# Patient Record
Sex: FEMALE | Race: WHITE | NOT HISPANIC OR LATINO | URBAN - METROPOLITAN AREA
[De-identification: names, ages, dates, MRNs, and addresses within clinical notes are randomized per-mention and may not be internally consistent; named-entity substitution may affect disease eponyms.]

---

## 2017-03-23 ENCOUNTER — OFFICE VISIT - RIVER FALLS (OUTPATIENT)
Dept: FAMILY MEDICINE | Facility: CLINIC | Age: 57
End: 2017-03-23
Payer: COMMERCIAL

## 2020-11-11 ENCOUNTER — OFFICE VISIT - RIVER FALLS (OUTPATIENT)
Dept: FAMILY MEDICINE | Facility: CLINIC | Age: 60
End: 2020-11-11
Payer: COMMERCIAL

## 2021-07-19 ENCOUNTER — LAB REQUISITION (OUTPATIENT)
Dept: LAB | Facility: CLINIC | Age: 61
End: 2021-07-19
Payer: COMMERCIAL

## 2021-07-19 ENCOUNTER — AMBULATORY - RIVER FALLS (OUTPATIENT)
Dept: FAMILY MEDICINE | Facility: CLINIC | Age: 61
End: 2021-07-19
Payer: COMMERCIAL

## 2021-07-19 DIAGNOSIS — U07.1 COVID-19: ICD-10-CM

## 2021-07-19 PROCEDURE — U0005 INFEC AGEN DETEC AMPLI PROBE: HCPCS | Mod: ORL | Performed by: FAMILY MEDICINE

## 2021-07-20 LAB — SARS-COV-2 RNA RESP QL NAA+PROBE: NEGATIVE

## 2021-07-21 LAB — SARS-COV-2 RNA RESP QL NAA+PROBE: NEGATIVE

## 2022-02-12 VITALS — HEART RATE: 80 BPM | DIASTOLIC BLOOD PRESSURE: 76 MMHG | SYSTOLIC BLOOD PRESSURE: 136 MMHG | TEMPERATURE: 98.7 F

## 2022-02-15 NOTE — NURSING NOTE
Comprehensive Intake Entered On:  11/11/2020 4:25 PM CST    Performed On:  11/11/2020 4:21 PM CST by Aline Mcallister CMA               Summary   Chief Complaint :   shingles - right buttock - noticed 1wk ago   Menstrual Status :   Hysterectomy   Systolic Blood Pressure :   136 mmHg (HI)    Diastolic Blood Pressure :   76 mmHg   Mean Arterial Pressure :   96 mmHg   Peripheral Pulse Rate :   80 bpm   Temperature Tympanic :   98.7 DegF(Converted to: 37.1 DegC)    Aline Mcallister CMA - 11/11/2020 4:21 PM CST   Health Status   Allergies Verified? :   Yes   Medication History Verified? :   Yes   Medical History Verified? :   Yes   Pre-Visit Planning Status :   Completed   Tobacco Use? :   Never smoker   Aline Mcallister CMA - 11/11/2020 4:21 PM CST   ID Risk Screen   Recent Travel History :   No recent travel   Family Member Travel History :   No recent travel   Other Exposure to Infectious Disease :   Unknown   Aline Mcallister CMA - 11/11/2020 4:21 PM CST   Social History   Social History   (As Of: 11/11/2020 4:25:40 PM CST)   Alcohol:        Current, Wine (5 oz), 1-2 times per month   (Last Updated: 11/15/2012 1:22:06 PM CST by Yesenia Morales MA)          Tobacco:  Denies Tobacco Use      Never   (Last Updated: 11/15/2012 1:22:14 PM CST by Yesenia Morales MA)          Substance Abuse:        Never   (Last Updated: 11/15/2012 1:22:18 PM CST by Yesenia Morales MA)          Home/Environment:        Marital status:  (Living together).  Spouse/Partner name: Vladimir.  Lives with Children, Spouse.   (Last Updated: 11/15/2012 1:22:32 PM CST by Yesenia Morales MA)          Exercise:        Exercise frequency: 3-4 times/week.  Exercise type: Walking.   (Last Updated: 11/15/2012 1:22:56 PM CST by Yesenia Morales MA)

## 2022-02-15 NOTE — TELEPHONE ENCOUNTER
---------------------  From: Krista Chan LPN (Phone Messages Pool ([id number]_[facility]))   Sent: 11/11/2020 2:14:50 PM CST  Subject: shingles     Phone Message    PCP:   RICHMOND      Time of Call:  1:49pm       Person Calling:  pt  Phone number:  [phone]    Returned call at: 2:10pm    Note:   Pt LM stating she has shingles and her friend who is a clinical nurse told her to get a Rx for acyclovir.    Returned call and told pt she has not been seen here since 2012 and she would need appt. Pt transferred to scheduling.    Last office visit and reason:  12/26/12 Acute Sinusitis

## 2022-02-15 NOTE — PROGRESS NOTES
Patient:   JOHN MAHARAJ            MRN: [id number]            FIN: [id number]               Age:   60 years     Sex:  Female     :  1960   Associated Diagnoses:   Herpes zoster   Author:   Ankit Trujillo MD      Visit Information      Date of Service: 2020 04:00 pm  Performing Location: [facility]  Encounter#: [id number]      Primary Care Provider (PCP):  Justin Kingston MD    NPI# [id number]      Referring Provider:  Ankit Trujillo MD    NPI# [id number]      Chief Complaint   2020 4:21 PM CST   shingles - right buttock - noticed 1wk ago              Additional Information:No additional information recorded during visit.   Chief complaint and symptoms as noted above and confirmed with patient.  Recent lab and diagnostic studies reviewed with patient      History of Present Illness   2020: John presents with 1 week history of discovered blistering rash on the left gluteal surface with associated skin sensitivity.  Initially noticed just as one focal spot though later started branching down laterally.  Had this evaluated by nursing friend who felt confident this is likely zoster advised her to come in for treatment.  Blistering lesions worse last week now healing over.  She says she can notice the pain associated with the rash but finds it very manageable and repeatedly states that she has a high pain tolerance.  No contact with any immunosuppressed individuals or pregnant people in the family         Review of Systems   Constitutional:  No fever, No chills.    Eye:  Negative except as documented in history of present illness.    Ear/Nose/Mouth/Throat:  Negative except as documented in history of present illness.    Respiratory   Cardiovascular   Gastrointestinal   Genitourinary   Hematology/Lymphatics:  Negative except as documented in history of present illness.    Endocrine:  No excessive thirst, No polyuria.    Immunologic:  No recurrent fevers.    Musculoskeletal:  No joint  pain, No muscle pain.    Integumentary:  Rash, Pruritus.    Neurologic:  Alert and oriented X4, No numbness, No tingling, No headache.       Health Status   Allergies:    Allergic Reactions (Selected)  No known allergies   Medications:  (Selected)   Documented Medications  Documented  Fish Oil 1000 mg oral capsule: 1 cap(s) ( 1,000 mg ), PO, daily, cap(s), 0 Refill(s), Type: Maintenance  Vitamin B Complex with Iron and Intrinsic Factor oral capsule: 1 cap(s), PO, BID, # 60 cap(s), 0 Refill(s), Type: Maintenance  Vitamin D3: ( 2,000 International Unit ), PO, daily, 0 Refill(s), Type: Maintenance  calcium (as citrate)-vitamin D 200 mg-200 intl units oral tablet: 2 tab(s), PO, daily, 0 Refill(s), Type: Maintenance  cranberry: 0 Refill(s), Type: Maintenance  green tea: 1-2 caps, PO, daily, 0 Refill(s), Type: Maintenance  turmeric: ( 500 mg ), Oral, daily, 0 Refill(s), Type: Maintenance,    Medications          *denotes recorded medication          *calcium (as citrate)-vitamin D 200 mg-200 intl units oral tablet: 2 tab(s), PO, daily.          *Vitamin D3: 2,000 International Unit, PO, daily.          *cranberry: 0 Refill(s).          *green tea: 1-2 caps, PO, daily.          *Vitamin B Complex with Iron and Intrinsic Factor oral capsule: 1 cap(s), PO, BID, 60 cap(s).          *Fish Oil 1000 mg oral capsule: 1,000 mg, 1 cap(s), PO, daily, cap(s).          *turmeric: 500 mg, Oral, daily, 0 Refill(s).       Problem list:    All Problems  Breast Cancer / ICD-9-.9 / Confirmed  Healthcare maintenance / ICD-9-CM V70.0 / Confirmed      Histories   Past Medical History:    Active  Healthcare maintenance (V70.0)  Breast Cancer (174.9)   Family History:    Hypothyroid  Mother     Procedure history:    Bilateral nipple reconstruction on 4/13/2012 at 52 Years.  Partial mastectomy ([id number]) on 4/12/2011 at 51 Years.  Comments:  4/30/2011 10:44 PM CDT - Kee , Karol  Left breast.  Breast lumpectomy ([id number]) on 4/4/2011 at  51 Years.  Comments:  6/13/2011 6:55 AM CDT - Karol Sweet  Left axillary sentinel lymph node biopsy.  S/P [facility] (V88.01) in the month of 12/1997 at 37 Years.  Comments:  3/14/2011 1:39 PM CDT - gabbyYesenia lerma  Fibroids   Social History:        Alcohol Assessment            Current, Wine (5 oz), 1-2 times per month      Tobacco Assessment: Denies Tobacco Use            Never      Substance Abuse Assessment            Never      Home and Environment Assessment            Marital status:  (Living together).  Spouse/Partner name: Vladimir.  Lives with Children, Spouse.      Exercise and Physical Activity Assessment            Exercise frequency: 3-4 times/week.  Exercise type: Walking.        Physical Examination   vital signs stable, as noted above   Vital Signs   11/11/2020 4:21 PM CST Temperature Tympanic 98.7 DegF    Peripheral Pulse Rate 80 bpm    Systolic Blood Pressure 136 mmHg  HI    Diastolic Blood Pressure 76 mmHg    Mean Arterial Pressure 96 mmHg      General:  Alert and oriented, No acute distress.    Respiratory:  Respirations are non-labored.    Cardiovascular:  Normal rate.    Integumentary:  focal, healing rash along right gluteal area with dermotomal distribution; no active vesicles.    Neurologic:  Alert, Oriented, Normal motor function, No focal deficits.    Cognition and Speech:  Oriented, Speech clear and coherent.    Psychiatric:  Appropriate mood & affect.       Review / Management   Results review      Impression and Plan   Diagnosis     Herpes zoster (QNV67-TQ B02.9).       .) vesicular rash, most c/w herpes zoster - already appearing to be in resolving phase; no active vesicles  > 1 week since original symptoms   - we discussed role of antiviral therapy and I think efficacy at this point is very low; consensus to hold off  - discussed available OTC options for neuralgia pains including capcaisin and lidocaine jelly   - could consider Rx for lidoderm patch if neuralgia pains become more  of any issue - she's not very troubled by pain  - advised to hold off on Shingrix vaccine for anytime in near future, at least >1 yr; age 65 would be suitable.

## 2022-02-15 NOTE — TELEPHONE ENCOUNTER
---------------------  From: Ochoa/Jazmín DOUGLAS (Phone Messages Pool ([id number]_[facility]))   To: Phone Messages Pool ([id number]_[place] [facility] [place]);     Sent: 7/21/2021 8:55:57 AM CDT  Subject: General Message     Received call from pt at 0854. She is looking for covid test results as she is flying internationally today and is needing the results. I told pt  I would look into this and call her back ASAP.Spoke to Stephie, results are not back yet. Patient states that her plane leaves at 3 today.Called and left message on identified voicemail with negative covid results. Informed her I will leave a copy of results at the  for her since she needs it for her travel today for [facility]. Advised pt to call us back if she does not need copy anymore.

## 2022-02-15 NOTE — TELEPHONE ENCOUNTER
---------------------  From: Stephie Alejandro CMA   Sent: 7/19/2021 3:12:45 PM CDT  Subject: Curbside Testing     Pt seen at [facility] today for covid testing per Dr. Curtis for traveling guidelines-pt travels to [facility] on 7/21/21. Will need a hard copy of results.

## 2023-08-24 ENCOUNTER — TELEPHONE (OUTPATIENT)
Dept: FAMILY MEDICINE | Facility: CLINIC | Age: 63
End: 2023-08-24
Payer: COMMERCIAL

## 2023-08-24 DIAGNOSIS — L03.90 CELLULITIS, UNSPECIFIED CELLULITIS SITE: Primary | ICD-10-CM

## 2023-08-24 RX ORDER — SULFAMETHOXAZOLE/TRIMETHOPRIM 800-160 MG
1 TABLET ORAL 2 TIMES DAILY
Qty: 14 TABLET | Refills: 0 | Status: SHIPPED | OUTPATIENT
Start: 2023-08-24 | End: 2023-08-31

## 2023-08-24 NOTE — TELEPHONE ENCOUNTER
I have sent in a week of medication for her.  If she does not start to improve over the next 2 to 3 days a follow-up visit is advised.

## 2023-08-24 NOTE — TELEPHONE ENCOUNTER
RN called to speak with patient.  Notified of provider instructions.  Patient states that it is improving.    Patient will call back and schedule an appointment if not improving.      CORY Iverson, BSN  [place], [place]  [phone]

## 2023-08-24 NOTE — TELEPHONE ENCOUNTER
New Medication Request    What medication are you requesting?:Sulfamethoxazole- Trimethoprim 800 MG    Reason for medication request: Was in Urgent Care on 8/13 With Cellulitis on left ankle - needs another 10 day supply and  Urgent Care said to follow up with her Primary    Have you taken this medication before?: Yes    Controlled Substance Agreement on file:   CSA -- Patient Level:    CSA: None found at the patient level.         Patient offered an appointment? Yes    Preferred Pharmacy:  [id number] [place], [place] - [address] [place] [address] AT [address]  [address] [facility] [address]  Phone: [phone] Fax: [phone]      Okay to leave a detailed message?: Yes at Cell number on file:    Telephone Information:   Mobile [phone]

## 2023-09-20 ENCOUNTER — ANCILLARY PROCEDURE (OUTPATIENT)
Dept: GENERAL RADIOLOGY | Facility: CLINIC | Age: 63
End: 2023-09-20
Attending: PHYSICIAN ASSISTANT
Payer: COMMERCIAL

## 2023-09-20 ENCOUNTER — OFFICE VISIT (OUTPATIENT)
Dept: FAMILY MEDICINE | Facility: CLINIC | Age: 63
End: 2023-09-20
Payer: COMMERCIAL

## 2023-09-20 VITALS
TEMPERATURE: 98.4 F | OXYGEN SATURATION: 100 % | HEART RATE: 84 BPM | WEIGHT: 137.9 LBS | DIASTOLIC BLOOD PRESSURE: 81 MMHG | HEIGHT: 65 IN | SYSTOLIC BLOOD PRESSURE: 148 MMHG | BODY MASS INDEX: 22.98 KG/M2 | RESPIRATION RATE: 16 BRPM

## 2023-09-20 DIAGNOSIS — S93.402D SPRAIN OF LEFT ANKLE, UNSPECIFIED LIGAMENT, SUBSEQUENT ENCOUNTER: ICD-10-CM

## 2023-09-20 DIAGNOSIS — M25.472 LEFT ANKLE SWELLING: ICD-10-CM

## 2023-09-20 DIAGNOSIS — M25.472 LEFT ANKLE SWELLING: Primary | ICD-10-CM

## 2023-09-20 PROCEDURE — 99213 OFFICE O/P EST LOW 20 MIN: CPT | Performed by: PHYSICIAN ASSISTANT

## 2023-09-20 PROCEDURE — 73610 X-RAY EXAM OF ANKLE: CPT | Mod: TC | Performed by: RADIOLOGY

## 2023-09-20 NOTE — PROGRESS NOTES
Assessment & Plan     Pt was seen for persistent swelling, some pain and joint laxity following inversion ankle sprain 3 months ago.  She is not limited in any activities and has continued be physically active on it, working on elevation for swelling control and has full range of motion.  X-rays reviewed and there are some dengerative changes, she has a lucency at the distal tibia of unclear significance which will await final reading from radiology.  I have recommended continued elevation, relative rest, compression, continued ROM exercises.  She defers PT and Ortho referrals.    She likely has a component of chronic joint instability from her previous injuries, not interested in discussing with orthopedist at this time.  The cellulitis is improved.  No signs of DVT given no foot swelling or leg swelling and only joint swelling well localized to the ankle.      Left ankle swelling    - XR Ankle Left G/E 3 Views    Sprain of left ankle, unspecified ligament, subsequent encounter      BEN Price [facility] [place]    Ty Cárdenas is a 63 year old female who presents to clinic today for the following health issues:  Chief Complaint   Patient presents with    Trauma     Patient had left ankle sprain beginning of July. Has been trying to rest it but walks a lot at work but patient having increasing swelling lately. Gets better when she elevates it. Doing exercises/PT.      History of Present Illness       Reason for visit:  Follow up on cellulitus in my left ankle and sprain    She eats 4 or more servings of fruits and vegetables daily.She consumes 0 sweetened beverage(s) daily.She exercises with enough effort to increase her heart rate 60 or more minutes per day.  She exercises with enough effort to increase her heart rate 4 days per week.   She is taking medications regularly.  Inversion VINCENZO about 3 weeks ago with ongoing swelling, some pain.  Has been able to continue with  "normal activities, swelling worse at the end of the day. Using splint, trying to elevate but she is quite active.  No T/N.  Pain is improved but not 100% resolved.  Has been doing some ROM exercises with chiropractor.  Did have bout of cellulitis which is essentially resolved.  No fevers.        Review of Systems  Constitutional, HEENT, cardiovascular, pulmonary, gi and gu systems are negative, except as otherwise noted.      Objective    BP (!) 148/81 (BP Location: Right arm, Patient Position: Sitting, Cuff Size: Adult Regular)   Pulse 84   Temp 98.4  F (36.9  C)   Resp 16   Ht 1.638 m (5' 4.5\")   Wt 62.6 kg (137 lb 14.4 oz)   SpO2 100%   BMI 23.30 kg/m    Physical Exam  Musculoskeletal:      Right ankle: Normal.      Left ankle: Swelling (medial and lateral ankle) present. No deformity, ecchymosis or lacerations. Normal range of motion. Anterior drawer test positive. Normal pulse.      Left Achilles Tendon: Normal. No tenderness or defects. Larson's test negative.       Xray per my independent evaluation:  moderate degenerative changes at the ankle with irregular area of lucency at the distal tibia of unclear significance, await radiology reading.  .              Answers submitted by the patient for this visit:  General Questionnaire (Submitted on 9/20/2023)  Chief Complaint: Chronic problems general questions HPI Form  What is the reason for your visit today? : follow up on cellulitus in my left ankle and sprain  How many servings of fruits and vegetables do you eat daily?: 4 or more  On average, how many sweetened beverages do you drink each day (Examples: soda, juice, sweet tea, etc.  Do NOT count diet or artificially sweetened beverages)?: 0  How many minutes a day do you exercise enough to make your heart beat faster?: 60 or more  How many days a week do you exercise enough to make your heart beat faster?: 4  How many days per week do you miss taking your medication?: 0    "

## 2024-06-12 ENCOUNTER — OFFICE VISIT (OUTPATIENT)
Dept: FAMILY MEDICINE | Facility: CLINIC | Age: 64
End: 2024-06-12
Payer: COMMERCIAL

## 2024-06-12 VITALS
HEIGHT: 65 IN | BODY MASS INDEX: 23.16 KG/M2 | SYSTOLIC BLOOD PRESSURE: 136 MMHG | DIASTOLIC BLOOD PRESSURE: 78 MMHG | HEART RATE: 89 BPM | RESPIRATION RATE: 16 BRPM | WEIGHT: 139 LBS | TEMPERATURE: 98.3 F | OXYGEN SATURATION: 99 %

## 2024-06-12 DIAGNOSIS — N89.8 VAGINAL DISCHARGE: Primary | ICD-10-CM

## 2024-06-12 PROBLEM — M85.851 OSTEOPENIA OF NECKS OF BOTH FEMURS: Status: ACTIVE | Noted: 2023-12-22

## 2024-06-12 PROBLEM — M85.852 OSTEOPENIA OF NECKS OF BOTH FEMURS: Status: ACTIVE | Noted: 2023-12-22

## 2024-06-12 PROBLEM — C50.919 MALIGNANT NEOPLASM OF BREAST (H): Status: ACTIVE | Noted: 2017-11-03

## 2024-06-12 LAB
ALBUMIN UR-MCNC: NEGATIVE MG/DL
APPEARANCE UR: CLEAR
BILIRUB UR QL STRIP: NEGATIVE
CLUE CELLS: ABNORMAL
COLOR UR AUTO: YELLOW
GLUCOSE UR STRIP-MCNC: NEGATIVE MG/DL
HGB UR QL STRIP: NEGATIVE
KETONES UR STRIP-MCNC: NEGATIVE MG/DL
LEUKOCYTE ESTERASE UR QL STRIP: NEGATIVE
NITRATE UR QL: NEGATIVE
PH UR STRIP: 5.5 [PH] (ref 5–7)
SP GR UR STRIP: <=1.005 (ref 1–1.03)
T VAGINALIS DNA SPEC QL NAA+PROBE: NOT DETECTED
TRICHOMONAS, WET PREP: ABNORMAL
UROBILINOGEN UR STRIP-ACNC: 0.2 E.U./DL
WBC'S/HIGH POWER FIELD, WET PREP: ABNORMAL
YEAST, WET PREP: ABNORMAL

## 2024-06-12 PROCEDURE — 81003 URINALYSIS AUTO W/O SCOPE: CPT | Mod: QW | Performed by: PHYSICIAN ASSISTANT

## 2024-06-12 PROCEDURE — 87591 N.GONORRHOEAE DNA AMP PROB: CPT | Performed by: PHYSICIAN ASSISTANT

## 2024-06-12 PROCEDURE — 87210 SMEAR WET MOUNT SALINE/INK: CPT | Mod: QW | Performed by: PHYSICIAN ASSISTANT

## 2024-06-12 PROCEDURE — 99213 OFFICE O/P EST LOW 20 MIN: CPT | Performed by: PHYSICIAN ASSISTANT

## 2024-06-12 PROCEDURE — 87661 TRICHOMONAS VAGINALIS AMPLIF: CPT | Performed by: PHYSICIAN ASSISTANT

## 2024-06-12 PROCEDURE — 87491 CHLMYD TRACH DNA AMP PROBE: CPT | Performed by: PHYSICIAN ASSISTANT

## 2024-06-12 RX ORDER — MULTIVITAMIN
1 TABLET ORAL
COMMUNITY

## 2024-06-12 NOTE — PROGRESS NOTES
Assessment & Plan     Vaginal discharge  Will obtain labs as ordered and contact pt with results.    Wet prep returned normal.   UA returned unremarkable   GC / chlamydia pending.    Trichomonas pending.    Consider nonspecific or irritative vaginitis as well as atrophic vaginitis if sx are perseistent and labs are normal/negative   Recommend partner be tested for STI given his sx as well.          - Wet prep - Clinic Collect  - NEISSERIA GONORRHOEA PCR  - CHLAMYDIA TRACHOMATIS PCR  - UA Macroscopic with reflex to Microscopic and Culture - Clinic Collect  - Trichomonas vaginalis DNA PCR  - Trichomonas vaginalis DNA PCR       Deandra Zapien PA-C  [facility] [place]    yT Cárdenas is a 64 year old female who presents to clinic today for the following health issues:  Chief Complaint   Patient presents with    Vaginal Discharge     Male partner reported to patient a chance of STD. Concerned about yeast infection     History of Present Illness       Reason for visit:  Possible yeast infection  Symptom onset:  1-3 days ago  Symptoms include:  My friend said he may have one  Symptom intensity:  Mild  Symptom progression:  Staying the same  Had these symptoms before:  No    She eats 4 or more servings of fruits and vegetables daily.She consumes 0 sweetened beverage(s) daily.She exercises with enough effort to increase her heart rate 30 to 60 minutes per day.  She exercises with enough effort to increase her heart rate 3 or less days per week.   She is taking medications regularly.    Pt reports concern for yeast infection, she has had a discharge change with increased discharge but no itching, irritation. No abdomen pain but does have some pressure.  No fevers. Some urgency but no dysuria, frequency.  Sexual partner with discharge from the penis and irritation at the tip of the penis. Was evaluated with working dx of possible yeast dermatitis at the head of the penis.  But was also  "recommended he be tested for STI.  They are in a mutually monogenous relationship. Pt has had a hysterectomy.      Review of Systems  Constitutional, HEENT, cardiovascular, pulmonary, gi and gu systems are negative, except as otherwise noted.      Patient Active Problem List   Diagnosis    Malignant neoplasm of breast (H)    Malignant neoplasm of upper-inner quadrant of left breast in female, estrogen receptor positive (H)    Osteopenia of necks of both femurs     Current Outpatient Medications   Medication Sig Dispense Refill    Cholecalciferol (VITAMIN D-3 PO) Take  by mouth.      Cranberry 1000 MG CAPS       FISH OIL       GREEN TEA       Probiotic Product (FLORAJEN3) CAPS Take  by mouth.      Specialty Vitamins Products (VITAMINS FOR HAIR) TABS Take 1 tablet by mouth      vitamin B-Complex Take 1 tablet by mouth daily       No current facility-administered medications for this visit.       Objective    /78 (BP Location: Right arm, Patient Position: Sitting)   Pulse 89   Temp 98.3  F (36.8  C) (Tympanic)   Resp 16   Ht 1.638 m (5' 4.5\")   Wt 63 kg (139 lb)   LMP  (LMP Unknown)   SpO2 99%   BMI 23.49 kg/m    Physical Exam   Nad appears well  Results for orders placed or performed in visit on 06/12/24   Wet prep - Clinic Collect     Status: Abnormal    Specimen: Vagina; Swab   Result Value Ref Range    Trichomonas Absent Absent    Yeast Absent Absent    Clue Cells Absent Absent    WBCs/high power field 4+ (A) None               "

## 2024-06-13 LAB
C TRACH DNA SPEC QL NAA+PROBE: NEGATIVE
N GONORRHOEA DNA SPEC QL NAA+PROBE: NEGATIVE

## 2024-07-09 ENCOUNTER — OFFICE VISIT (OUTPATIENT)
Dept: FAMILY MEDICINE | Facility: CLINIC | Age: 64
End: 2024-07-09
Payer: COMMERCIAL

## 2024-07-09 VITALS
HEART RATE: 76 BPM | BODY MASS INDEX: 23.09 KG/M2 | WEIGHT: 138.6 LBS | OXYGEN SATURATION: 99 % | SYSTOLIC BLOOD PRESSURE: 152 MMHG | HEIGHT: 65 IN | RESPIRATION RATE: 16 BRPM | TEMPERATURE: 97.5 F | DIASTOLIC BLOOD PRESSURE: 77 MMHG

## 2024-07-09 DIAGNOSIS — S20.162A INSECT BITE OF LEFT BREAST, INITIAL ENCOUNTER: Primary | ICD-10-CM
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
## 2024-07-09 PROCEDURE — 99213 OFFICE O/P EST LOW 20 MIN: CPT | Performed by: FAMILY MEDICINE

## 2024-07-09 RX ORDER — CEPHALEXIN 500 MG/1
1000 CAPSULE ORAL 2 TIMES DAILY
Qty: 20 CAPSULE | Refills: 0 | Status: SHIPPED | OUTPATIENT
Start: 2024-07-09 | End: 2024-07-14

## 2024-07-09 NOTE — PROGRESS NOTES
"  Assessment & Plan     Insect bite of left breast, initial encounter  Doubt cellulitis given slow improvement.  Will continue to monitor and if symptoms or not improving over the next 48 hours or worsen before that time she will start prescription for cephalexin and follow-up if not improving with antibiotics.  - cephALEXin (KEFLEX) 500 MG capsule; Take 2 capsules (1,000 mg) by mouth 2 times daily for 5 days                Ty Cárdenas is a 64 year old, presenting for the following health issues:  Insect Bites (Bug bite on left breast. Patient noticed the bite on Saturday. Patient states it is itchy and red.)        7/9/2024     1:13 PM   Additional Questions   Roomed by Essence-LESLIE     History of Present Illness       Reason for visit:  Infected bug bite and now a red rash  Symptom onset:  1-3 days ago  Symptoms include:  Red rash  Symptom intensity:  Mild  Symptom progression:  Staying the same  Had these symptoms before:  No  What makes it worse:  No  What makes it better:  It is just there. Not getting better or worse.    She eats 4 or more servings of fruits and vegetables daily.She consumes 0 sweetened beverage(s) daily.She exercises with enough effort to increase her heart rate 20 to 29 minutes per day.  She exercises with enough effort to increase her heart rate 4 days per week.   She is taking medications regularly.                     Objective    BP (!) 152/77 (BP Location: Right arm, Patient Position: Sitting, Cuff Size: Adult Large)   Pulse 76   Temp 97.5  F (36.4  C) (Tympanic)   Resp 16   Ht 1.638 m (5' 4.5\")   Wt 62.9 kg (138 lb 9.6 oz)   LMP  (LMP Unknown)   SpO2 99%   BMI 23.42 kg/m    Body mass index is 23.42 kg/m .  Physical Exam   Alert, oriented, no acute distress  Neurology  Nonlabored breathing  Exam of the left breast reveals a 3 x 5 cm area of erythema, no induration, slight warmth inferior left breast            Signed Electronically by: Justin Kingston MD    "

## 2024-08-04 ENCOUNTER — HEALTH MAINTENANCE LETTER (OUTPATIENT)
Age: 64
End: 2024-08-04

## 2025-04-09 ENCOUNTER — PATIENT OUTREACH (OUTPATIENT)
Dept: FAMILY MEDICINE | Facility: CLINIC | Age: 65
End: 2025-04-09
Payer: COMMERCIAL

## 2025-04-09 NOTE — TELEPHONE ENCOUNTER
Patient Quality Outreach    Patient is due for the following:   Colon Cancer Screening  Physical Preventive Adult Physical    Action(s) Taken:   Schedule a Adult Preventative    Type of outreach:    Sent c-crowd message.    Questions for provider review:    None         Yesenia Morales MA  Chart routed to None.

## 2025-07-03 ENCOUNTER — NURSE TRIAGE (OUTPATIENT)
Dept: FAMILY MEDICINE | Facility: CLINIC | Age: 65
End: 2025-07-03
Payer: COMMERCIAL

## 2025-07-03 NOTE — TELEPHONE ENCOUNTER
Nurse Triage SBAR    Is this a 2nd Level Triage? YES, LICENSED PRACTITIONER REVIEW IS REQUIRED    Situation: Incoming call from patient, she states that she thinks she got bit by a wood tick a few days ago.     Background: Down in woods on Tuesday cutting branches - she does not react to bug bites     Assessment:     1. ATTACHED:  she never saw the tick on- took a shower right away     Patient said that area is raised bump and red rash around that as well        3. ONSET - Wednesday night she noticed the raised bump on her skin  4. LOCATION: Left chest         5. TYPE of TICK: unsure of tick since she never saw it but could be a dear tick         6. SIZE of TICK: Unknown         8. OTHER SYMPTOMS: patient did state that there is a red ring around bite- Denies fever     Protocol Recommended Disposition:   See in Office Today    Recommendation: patient is in [facility] at oncology appointment with father and unable to do a visit, patient wondering about prophylactic antibiotic medication that patient can prescribe.       Routed to provider    Does the patient meet one of the following criteria for ADS visit consideration? No    Reason for Disposition   Red ring or bull's-eye rash occurs around a deer tick bite    Additional Information   Negative: Not a tick bite   Negative: Patient sounds very sick or weak to the triager   Negative: Fever or severe headache occurs, 2 to 14 days following the bite   Negative: Widespread rash occurs, 2 to 14 days following the bite   Negative: Can't remove live tick (after using Care Advice)   Negative: Fever and spreading red area or streak   Negative: Fever and area is very tender to touch   Negative: Red streak or red line and length > 2 inches (5 cm)   Negative: Red or very tender (to touch) area and started over 24 hours after the bite    Protocols used: Tick Bite-A-OH

## 2025-07-07 NOTE — TELEPHONE ENCOUNTER
Call to patient, relayed message below.     [facility], and prescribed doxycycline. No additional questions at this time.

## 2025-08-16 ENCOUNTER — HEALTH MAINTENANCE LETTER (OUTPATIENT)
Age: 65
End: 2025-08-16